# Patient Record
Sex: FEMALE | Race: BLACK OR AFRICAN AMERICAN | Employment: FULL TIME | URBAN - METROPOLITAN AREA
[De-identification: names, ages, dates, MRNs, and addresses within clinical notes are randomized per-mention and may not be internally consistent; named-entity substitution may affect disease eponyms.]

---

## 2017-01-02 ENCOUNTER — APPOINTMENT (OUTPATIENT)
Dept: CT IMAGING | Age: 42
End: 2017-01-02
Attending: FAMILY MEDICINE

## 2017-01-02 ENCOUNTER — HOSPITAL ENCOUNTER (OUTPATIENT)
Age: 42
Discharge: HOME | End: 2017-01-02
Attending: FAMILY MEDICINE

## 2017-01-02 VITALS
DIASTOLIC BLOOD PRESSURE: 77 MMHG | HEIGHT: 68 IN | TEMPERATURE: 99 F | HEART RATE: 67 BPM | OXYGEN SATURATION: 100 % | RESPIRATION RATE: 18 BRPM | BODY MASS INDEX: 30.31 KG/M2 | WEIGHT: 200 LBS | SYSTOLIC BLOOD PRESSURE: 111 MMHG

## 2017-01-02 DIAGNOSIS — K59.00 CONSTIPATION, UNSPECIFIED CONSTIPATION TYPE: ICD-10-CM

## 2017-01-02 DIAGNOSIS — K21.9 GASTROESOPHAGEAL REFLUX DISEASE, ESOPHAGITIS PRESENCE NOT SPECIFIED: Primary | ICD-10-CM

## 2017-01-02 LAB
#LYMPHOCYTE IC: 3 X10ˆ3/UL (ref 0.9–3.2)
#MXD IC: 0.7 X10ˆ3/UL (ref 0.1–1)
#NEUTROPHIL IC: 4.9 X10ˆ3/UL (ref 1.3–6.7)
CREAT SERPL-MCNC: 0.8 MG/DL (ref 0.4–1)
GLUCOSE BLD-MCNC: 99 MG/DL (ref 65–99)
HCT IC: 41.3 % (ref 37–54)
HGB IC: 13.7 G/DL (ref 12–16)
ISTAT BLOOD GAS TCO2: 22 MMOL/L (ref 22–32)
ISTAT BUN: 7 MG/DL (ref 8–20)
ISTAT CHLORIDE: 103 MMOL/L (ref 101–111)
ISTAT HEMATOCRIT: 42 % (ref 37–54)
ISTAT IONIZED CALCIUM: 1.08 MMOL/L (ref 1.12–1.32)
ISTAT POTASSIUM: 4 MMOL/L (ref 3.6–5.1)
ISTAT SODIUM: 139 MMOL/L (ref 136–144)
MCH IC: 25.9 PG (ref 27–33.2)
MCHC IC: 33.2 G/DL (ref 31–37)
MCV IC: 78.1 FL (ref 81–100)
PLT IC: 211 X10ˆ3/UL (ref 150–450)
POCT BILIRUBIN URINE: NEGATIVE
POCT BLOOD URINE: NEGATIVE
POCT GLUCOSE URINE: NEGATIVE MG/DL
POCT KETONE URINE: NEGATIVE MG/DL
POCT LEUKOCYTE ESTERASE URINE: NEGATIVE
POCT NITRITE URINE: NEGATIVE
POCT PH URINE: 7 (ref 5–8)
POCT PROTEIN URINE: NEGATIVE MG/DL
POCT SPECIFIC GRAVITY URINE: 1.01
POCT URINE CLARITY: CLEAR
POCT URINE COLOR: YELLOW
POCT URINE PREGNANCY: NEGATIVE
POCT UROBILINOGEN URINE: 0.2 MG/DL
RBC IC: 5.29 X10ˆ6/UL (ref 3.8–5.1)
WBC IC: 8.6 X10ˆ3/UL (ref 4–13)

## 2017-01-02 PROCEDURE — 85025 COMPLETE CBC W/AUTO DIFF WBC: CPT | Performed by: FAMILY MEDICINE

## 2017-01-02 PROCEDURE — 81025 URINE PREGNANCY TEST: CPT | Performed by: FAMILY MEDICINE

## 2017-01-02 PROCEDURE — 80047 BASIC METABLC PNL IONIZED CA: CPT

## 2017-01-02 PROCEDURE — 81002 URINALYSIS NONAUTO W/O SCOPE: CPT | Performed by: FAMILY MEDICINE

## 2017-01-02 PROCEDURE — 99204 OFFICE O/P NEW MOD 45 MIN: CPT

## 2017-01-02 PROCEDURE — 99205 OFFICE O/P NEW HI 60 MIN: CPT

## 2017-01-02 PROCEDURE — 83690 ASSAY OF LIPASE: CPT | Performed by: FAMILY MEDICINE

## 2017-01-02 PROCEDURE — 96360 HYDRATION IV INFUSION INIT: CPT

## 2017-01-02 PROCEDURE — 80076 HEPATIC FUNCTION PANEL: CPT | Performed by: FAMILY MEDICINE

## 2017-01-02 PROCEDURE — 74176 CT ABD & PELVIS W/O CONTRAST: CPT

## 2017-01-02 RX ORDER — MAGNESIUM HYDROXIDE/ALUMINUM HYDROXICE/SIMETHICONE 120; 1200; 1200 MG/30ML; MG/30ML; MG/30ML
30 SUSPENSION ORAL ONCE
Status: COMPLETED | OUTPATIENT
Start: 2017-01-02 | End: 2017-01-02

## 2017-01-02 RX ORDER — ONDANSETRON 4 MG/1
4 TABLET, ORALLY DISINTEGRATING ORAL ONCE
Status: COMPLETED | OUTPATIENT
Start: 2017-01-02 | End: 2017-01-02

## 2017-01-02 RX ORDER — OMEPRAZOLE 40 MG/1
40 CAPSULE, DELAYED RELEASE ORAL DAILY
Qty: 30 CAPSULE | Refills: 0 | Status: SHIPPED | OUTPATIENT
Start: 2017-01-02 | End: 2017-02-01

## 2017-01-02 RX ORDER — SODIUM CHLORIDE 9 MG/ML
INJECTION, SOLUTION INTRAVENOUS ONCE
Status: COMPLETED | OUTPATIENT
Start: 2017-01-02 | End: 2017-01-02

## 2017-01-02 NOTE — ED INITIAL ASSESSMENT (HPI)
Epigastric pain - describes as burning on and off x 4 days; took praveen seltzer but no relief.  Denies nausea or vomiting   belching- x 4 days  Passing gas- x 4 days

## 2017-01-03 LAB
ALBUMIN SERPL-MCNC: 4.1 G/DL (ref 3.5–4.8)
ALP LIVER SERPL-CCNC: 80 U/L (ref 37–98)
ALT SERPL-CCNC: 16 U/L (ref 14–54)
AST SERPL-CCNC: 13 U/L (ref 15–41)
BASOPHILS # BLD AUTO: 0.06 X10(3) UL (ref 0–0.1)
BASOPHILS NFR BLD AUTO: 0.7 %
BILIRUB DIRECT SERPL-MCNC: 0.1 MG/DL (ref 0.1–0.5)
BILIRUB SERPL-MCNC: 0.4 MG/DL (ref 0.1–2)
EOSINOPHIL # BLD AUTO: 0.16 X10(3) UL (ref 0–0.3)
EOSINOPHIL NFR BLD AUTO: 1.9 %
ERYTHROCYTE [DISTWIDTH] IN BLOOD BY AUTOMATED COUNT: 12.1 % (ref 11.5–16)
HCT VFR BLD AUTO: 42.4 % (ref 34–50)
HGB BLD-MCNC: 13.9 G/DL (ref 12–16)
IMMATURE GRANULOCYTE COUNT: 0.01 X10(3) UL (ref 0–1)
IMMATURE GRANULOCYTE RATIO %: 0.1 %
LIPASE: 142 U/L (ref 73–393)
LYMPHOCYTES # BLD AUTO: 2.91 X10(3) UL (ref 0.9–4)
LYMPHOCYTES NFR BLD AUTO: 33.7 %
M PROTEIN MFR SERPL ELPH: 7.7 G/DL (ref 6.1–8.3)
MCH RBC QN AUTO: 25.8 PG (ref 27–33.2)
MCHC RBC AUTO-ENTMCNC: 32.8 G/DL (ref 31–37)
MCV RBC AUTO: 78.7 FL (ref 81–100)
MONOCYTES # BLD AUTO: 0.79 X10(3) UL (ref 0.1–0.6)
MONOCYTES NFR BLD AUTO: 9.1 %
NEUTROPHIL ABS PRELIM: 4.71 X10 (3) UL (ref 1.3–6.7)
NEUTROPHILS # BLD AUTO: 4.71 X10(3) UL (ref 1.3–6.7)
NEUTROPHILS NFR BLD AUTO: 54.5 %
PLATELET # BLD AUTO: 172 10(3)UL (ref 150–450)
RBC # BLD AUTO: 5.39 X10(6)UL (ref 3.8–5.1)
RED CELL DISTRIBUTION WIDTH-SD: 34.2 FL (ref 35.1–46.3)
WBC # BLD AUTO: 8.6 X10(3) UL (ref 4–13)

## 2017-01-03 NOTE — ED NOTES
Pt returns phone call. Informed of the mildly abnormal CBC results. Verbalizes understanding that she needs to continue omeprazole and follow up with PCP in the next 5-7 days. Pt states she will call her PCP now.  Denies further questions or concerns at th

## 2017-01-03 NOTE — ED NOTES
Final result received today:  Cbc with diff, hepatic panel,lipase  Medication:  omeprazole  Pending test:  Sent for Review to:  Dr. Claudene Pounds  Notes:

## 2017-01-03 NOTE — ED NOTES
Received call from patient's PCP office,spoke to Cedrick Arciniega. She states patient is in their office right now and would like to obtain her lab results. Lab results faxed to Gladis Clayton.

## 2017-01-03 NOTE — ED NOTES
Per dr. Wan Sea felt worse after maalox and viscous lidocaine. With orders given. >pt states she felt more nauseous after taking the medication. emesis basin given. No vomiting episode.

## 2017-01-03 NOTE — ED NOTES
Pt states she feels better after zofran, she denies any nausea or vomiting . Pt taken to CT ambulatory with writer.

## 2017-01-03 NOTE — ED NOTES
WARM BLANKET AND PILLOW PROVIDED. LIGHTS DIMMED AND BED ON LOW  FOWLERS FOR COMFORT. 1 SIDE RAIL UP. CALL LIGHT WITHIN REACH.  IVF INFUSING WELL

## 2017-01-03 NOTE — ED PROVIDER NOTES
Patient Seen in: THE [facility] In [facility]    History   Patient presents with:  Epigastric Pain    Stated Complaint: acid reflux     HPI  70-year-old female here with complaints of severe acid reflux to the point where she can feel her food \" answering appropriately   SKIN: No pallor, no erythema, no cyanosis, warm and dry  Eyes: wnl, normal conjunctiva   HEAD: Normocephalic, atraumatic  EENT: OP - wnl, moist, Nares normal  NECK: FROM, supple  LUNGS: CTAB, no RRW  CV: RRR  ABD: NABS, tenderness Abdomen+pelvis(cpt=74176)    1/2/2017  PROCEDURE:  CT ABDOMEN+PELVIS(CPT=74176)  COMPARISON:  None. INDICATIONS:  acid reflux  TECHNIQUE:  Unenhanced multislice CT scanning was performed from the dome of the diaphragm to the pubic symphysis.   Dose reducti Minimal circumferential wall thickening of the distal esophagus may represent reflux. Mild to moderate amount of stool seen throughout the colon. The appendix is normal. No obstruction or free air.     Dictated by: Brinda Alva MD on 1/02/2017 at 19:48 Medications Prescribed:  Current Discharge Medication List    START taking these medications    Omeprazole 40 MG Oral Capsule Delayed Release  Take 1 capsule (40 mg total) by mouth daily.   Qty: 30 capsule Refills: 0

## (undated) NOTE — ED AVS SNAPSHOT
[facility] in [address] [place] [address] Drive,[address]    [address] [place] [address]    Phone:  [phone]    Fax:  [phone]           Yan Luo   MRN: [id number]    Department:  [facility]   Date of Visit:  1/2/2017 [place] - [address]  [address], [phone], [phone]  Arabella Garsia, [phone] [phone]     Phone:  [phone]    - Omeprazole 40 MG Cpdr              Discharge Instructions         EAT SMALL MEALS THAT ARE [address] [facility] Expect to receive an electronic request (by e-mail or text) to complete a self-assessment the day after your visit. You may also receive a call from our patient liason soon after your visit.  Also, some patients receive a detailed feedback survey mailed to Lillian Jaeger [address] [facility] [phone] [address] [facility] [address] ([address]) [facility] [address] [facility].  (Cat PROCEDURE:  CT ABDOMEN+PELVIS(CPT=74176)     COMPARISON:  None. INDICATIONS:  acid reflux     TECHNIQUE:  Unenhanced multislice CT scanning was performed from the dome of the diaphragm to the pubic symphysis. Dose reduction techniques were used.  Dose Sign up for [employer], your secure online medical record. [employer] will allow you to access patient instructions from your recent visit,  view other health information, and more. To sign up or find more information, go to [email or web address]